# Patient Record
Sex: FEMALE | Race: WHITE | NOT HISPANIC OR LATINO | Employment: FULL TIME | URBAN - NONMETROPOLITAN AREA
[De-identification: names, ages, dates, MRNs, and addresses within clinical notes are randomized per-mention and may not be internally consistent; named-entity substitution may affect disease eponyms.]

---

## 2019-07-08 ENCOUNTER — HOSPITAL ENCOUNTER (EMERGENCY)
Facility: HOSPITAL | Age: 19
Discharge: HOME | End: 2019-07-08
Attending: EMERGENCY MEDICINE | Admitting: EMERGENCY MEDICINE

## 2019-07-08 VITALS
RESPIRATION RATE: 18 BRPM | OXYGEN SATURATION: 98 % | HEART RATE: 96 BPM | SYSTOLIC BLOOD PRESSURE: 123 MMHG | TEMPERATURE: 98.2 F | HEIGHT: 65 IN | WEIGHT: 280 LBS | DIASTOLIC BLOOD PRESSURE: 88 MMHG | BODY MASS INDEX: 46.65 KG/M2

## 2019-07-08 DIAGNOSIS — J30.81 ALLERGIC RHINITIS DUE TO ANIMAL HAIR AND DANDER: Primary | ICD-10-CM

## 2019-07-08 PROCEDURE — 99284 EMERGENCY DEPT VISIT MOD MDM: CPT

## 2019-07-08 RX ORDER — HYDROXYZINE HYDROCHLORIDE 25 MG/1
25 TABLET, FILM COATED ORAL EVERY 8 HOURS PRN
Qty: 18 TABLET | Refills: 0 | Status: SHIPPED | OUTPATIENT
Start: 2019-07-08

## 2019-07-08 RX ORDER — HYDROXYZINE HYDROCHLORIDE 25 MG/1
25 TABLET, FILM COATED ORAL ONCE
Status: COMPLETED | OUTPATIENT
Start: 2019-07-08 | End: 2019-07-08

## 2019-07-08 RX ADMIN — HYDROXYZINE HYDROCHLORIDE 25 MG: 25 TABLET, FILM COATED ORAL at 13:22

## 2019-07-08 NOTE — ED PROVIDER NOTES
Subjective   Patient presents to ER with shortness of breath after getting exposed to pet dander.        Shortness of Breath   Severity:  Mild  Onset quality:  Gradual  Timing:  Constant  Progression:  Worsening  Chronicity:  Recurrent  Context comment:  Allergies to cat dander  Relieved by:  Nothing  Worsened by:  Nothing  Ineffective treatments:  None tried      Review of Systems   Constitutional: Positive for activity change and fatigue.   HENT: Positive for congestion.    Eyes: Negative.    Respiratory: Positive for shortness of breath.    Cardiovascular: Negative.    Gastrointestinal: Negative.    Endocrine: Negative.    Genitourinary: Negative.    Musculoskeletal: Negative.    Skin: Negative.    Allergic/Immunologic: Negative.    Neurological: Negative.    Hematological: Negative.    Psychiatric/Behavioral: Negative.        History reviewed. No pertinent past medical history.    Allergies   Allergen Reactions   • Bleach [Sodium Hypochlorite] Shortness Of Breath   • Cat Hair Extract Shortness Of Breath       History reviewed. No pertinent surgical history.    History reviewed. No pertinent family history.    Social History     Socioeconomic History   • Marital status: Single     Spouse name: Not on file   • Number of children: Not on file   • Years of education: Not on file   • Highest education level: Not on file   Tobacco Use   • Smoking status: Never Smoker   Substance and Sexual Activity   • Alcohol use: No     Frequency: Never   • Drug use: No   • Sexual activity: Defer           Objective   Physical Exam   Constitutional: She appears well-developed and well-nourished.   HENT:   Head: Normocephalic and atraumatic.   Eyes:   Itchy eyes, nasal congestion   Neck: Normal range of motion.   Cardiovascular: Normal rate and regular rhythm.   Pulmonary/Chest: Breath sounds normal.   Abdominal: Soft.   Musculoskeletal: Normal range of motion.   Neurological: She is alert.   Skin: Skin is warm.   Psychiatric: She  has a normal mood and affect.   Nursing note and vitals reviewed.      Procedures           ED Course                  MDM      Final diagnoses:   Allergic rhinitis due to animal hair and dander            Toby Morales MD  07/08/19 6916

## 2019-07-08 NOTE — DISCHARGE INSTRUCTIONS
Call one of the offices below to establish a primary care provider.  If you are unable to get an appointment and feel it is an emergency and need to be seen immediately please return to the Emergency Department.    Call one of the office below to set up a primary care provider.    Dr. Billy Ndiaye                                                                                                       [address] [facility] [address]  [phone]    Dr. Palacios, Dr. PERICO Coto, Dr. WILMER Coto ([facility])  [address] [facility] [address]  [phone]    Dr. Mathur, Dr. Huggins, Dr. Albright ([facility])  [address] [facility] [address]  [phone]    Dr. Joseph  [address] [facility] [address]  [phone]    Dr. Gardiner, Dr. Moulton, Dr. Pompa, Dr. Goode ([facility])  [address] [place] [address] [address]  [facility] [address]  [phone]    Dr. Lizzie Rivers  [address] [facility] [place] [address]  [phone]    Dr. Susan Granados  [address]   [address]  [facility] [address]  [phone]    Dr. Ndiaye  [address] [facility] [address]  [phone]    Dr. Zamora, Dr. Leung  [address] [facility] [place] [address]  [phone]    Dr. Solitario  [address]                                                              [address]  [facility] [address]  [phone]    Dr. Mendez  [address] [facility] [id number] [phone]    [address]. Keshia Ball  [address]  [address]  [facility] [address]  [phone]

## 2019-07-08 NOTE — ED NOTES
No respiratory distress noted pt sitting up in bed talking in full sentences,. Respirations regular even and non labored. no evidence of throat swelling, pt has a strong voice.      Albertina Pineda RN  07/08/19 3835